# Patient Record
Sex: MALE
[De-identification: names, ages, dates, MRNs, and addresses within clinical notes are randomized per-mention and may not be internally consistent; named-entity substitution may affect disease eponyms.]

---

## 2019-01-14 ENCOUNTER — HOSPITAL ENCOUNTER (EMERGENCY)
Dept: HOSPITAL 14 - H.ER | Age: 24
LOS: 1 days | Discharge: HOME | End: 2019-01-15
Payer: COMMERCIAL

## 2019-01-14 VITALS — OXYGEN SATURATION: 99 %

## 2019-01-14 DIAGNOSIS — R00.2: ICD-10-CM

## 2019-01-14 DIAGNOSIS — F12.10: Primary | ICD-10-CM

## 2019-01-15 VITALS — HEART RATE: 122 BPM

## 2019-01-15 VITALS — TEMPERATURE: 98 F | DIASTOLIC BLOOD PRESSURE: 85 MMHG | SYSTOLIC BLOOD PRESSURE: 124 MMHG | RESPIRATION RATE: 18 BRPM

## 2019-01-15 NOTE — ED PDOC
HPI: Chest Pain


Time Seen by Provider: 01/14/19 22:53


Chief Complaint (Nursing): Chest Pain


Chief Complaint (Provider): Chest Pain


History Per: Patient


History/Exam Limitations: no limitations


Onset/Duration Of Symptoms: Hrs


Additional Complaint(s): 





22 y/o male with no significant PMHx presents to the ED complaining of 

palpitations. Patient reports he had been smoking 3 marijuana cigarettes and 

then felt palpitations associated with chest pressure. He denies any alcohol use

or other drugs. Patients states he felt his left arm go numb as well.





Past Medical History


Reviewed: Historical Data, Nursing Documentation, Vital Signs


Vital Signs: 





                                Last Vital Signs











Temp  97.3 F L  01/14/19 22:46


 


Pulse  106 H  01/14/19 22:46


 


Resp  98 H  01/14/19 22:46


 


BP  143/79   01/14/19 22:46


 


Pulse Ox  99   01/14/19 22:46














- Medical History


PMH: No Chronic Diseases





- Surgical History


Surgical History: Back Surgery





- Family History


Family History: States: Unknown Family Hx





- Social History


Drugs: Cannabis





- Allergies


Allergies/Adverse Reactions: 


                                    Allergies











Allergy/AdvReac Type Severity Reaction Status Date / Time


 


No Known Allergies Allergy   Verified 01/14/19 22:46














Review of Systems


ROS Statement: Except As Marked, All Systems Reviewed And Found Negative


Cardiovascular: Positive for: Chest Pain, Palpitations


Neurological: Positive for: Numbness (left arm)





Physical Exam





- Reviewed


Nursing Documentation Reviewed: Yes


Vital Signs Reviewed: Yes





- Physical Exam


Appears: Positive for: Well, Non-toxic, No Acute Distress


Head Exam: Positive for: ATRAUMATIC, NORMOCEPHALIC


Skin: Positive for: Normal Color, Warm, Dry


Eye Exam: Positive for: EOMI, Normal appearance, PERRL


Neck: Positive for: Normal, Painless ROM


Cardiovascular/Chest: Positive for: Tachycardia


Respiratory: Positive for: Normal Breath Sounds.  Negative for: Respiratory 

Distress


Gastrointestinal/Abdominal: Positive for: Normal Exam, Soft.  Negative for: 

Tenderness


Extremity: Positive for: Normal ROM.  Negative for: Pedal Edema, Deformity


Neurologic/Psych: Positive for: Alert, Oriented, Mood/Affect (Anxious).  

Negative for: Motor/Sensory Deficits





- ECG


ECG Rhythm: Positive for: Sinus Tachycardia.  Negative for: ST/T Changes


Interpretation Of ECG: 





Sinus tachycardia at 122 bpm with no ST/T wave changes


Rate: 122


O2 Sat by Pulse Oximetry: 99 (RA)


Pulse Ox Interpretation: Normal





Medical Decision Making


Medical Decision Making: 





Time: 22:50


Initial Impression: 22 y/o male with chest pain after smoking marijuana. Patient

appears to be anxious.


No concern at this time for acute cardiac injury. Will observe on cardiac 

monitor and perform CXR.


Initial Plan: 


* EKG


* CXR





0000


--Patient feeling much better


--HR is 75


--Advised to abstain from marijuana use in the future


--Very well appearing upon discharge


 

--------------------------------------------------------------------------------


-----------------


Scribe Attestation:


Documented by Ángel Posada acting as a scribe for Nikunj Tsang MD.





Provider Scribe Attestation:


All medical record entries made by the Scribe were at my direction and 

personally dictated by me. I have reviewed the chart and agree that the record 

accurately reflects my personal performance of the history, physical exam, 

medical decision making, and the department course for this patient. I have also

personally directed, reviewed, and agree with the discharge instructions and 

disposition.





Disposition





- Clinical Impression


Clinical Impression: 


 Marijuana abuse, Palpitations








- Disposition


Referrals: 


[facility] [place] [Outside]


Disposition: Routine/Home


Disposition Time: 00:45


Condition: STABLE


Instructions:  Palpitations, Marijuana Use and Addiction (DC)


Forms:  [facility] (English)

## 2019-01-15 NOTE — RAD
Date of service: 



01/14/2019



HISTORY:

 cp, palpiations after MJ use 



COMPARISON:

No prior.



TECHNIQUE:

Chest PA and lateral



FINDINGS:



LUNGS:

No active pulmonary disease.



PLEURA:

No significant pleural effusion identified. No pneumothorax apparent.



CARDIOVASCULAR:

No aortic atherosclerotic calcification present.



Normal cardiac size. No pulmonary vascular congestion. 



OSSEOUS STRUCTURES:

No significant abnormalities.



VISUALIZED UPPER ABDOMEN:

Normal.



OTHER FINDINGS:

None.



IMPRESSION:

No active disease.

## 2019-01-15 NOTE — CARD
--------------- APPROVED REPORT --------------





Date of service: 01/14/2019



EKG Measurement

Heart [id number]

OR [id number]

[id number]

[id number]

[id number]



<Conclusion>

Sinus tachycardia

Otherwise normal ECG